# Patient Record
Sex: MALE | Race: WHITE
[De-identification: names, ages, dates, MRNs, and addresses within clinical notes are randomized per-mention and may not be internally consistent; named-entity substitution may affect disease eponyms.]

---

## 2023-04-06 ENCOUNTER — HOSPITAL ENCOUNTER (EMERGENCY)
Dept: HOSPITAL 4 - SED | Age: 58
Discharge: HOME | End: 2023-04-06
Payer: COMMERCIAL

## 2023-04-06 VITALS — SYSTOLIC BLOOD PRESSURE: 113 MMHG

## 2023-04-06 VITALS — BODY MASS INDEX: 21.07 KG/M2 | HEIGHT: 73 IN | WEIGHT: 159 LBS

## 2023-04-06 VITALS — SYSTOLIC BLOOD PRESSURE: 130 MMHG

## 2023-04-06 DIAGNOSIS — Z79.899: ICD-10-CM

## 2023-04-06 DIAGNOSIS — R07.89: ICD-10-CM

## 2023-04-06 DIAGNOSIS — L03.313: Primary | ICD-10-CM

## 2023-04-06 LAB
ALBUMIN SERPL BCP-MCNC: 2.5 G/DL (ref 3.4–4.8)
ALT SERPL W P-5'-P-CCNC: 25 U/L (ref 12–78)
ANION GAP SERPL CALCULATED.3IONS-SCNC: 6 MMOL/L (ref 5–15)
AST SERPL W P-5'-P-CCNC: 15 U/L (ref 10–37)
BASOPHILS # BLD AUTO: 0 K/UL (ref 0–0.2)
BASOPHILS NFR BLD AUTO: 0.5 % (ref 0–2)
BILIRUB SERPL-MCNC: 0.2 MG/DL (ref 0–1)
BUN SERPL-MCNC: 17 MG/DL (ref 8–21)
CALCIUM SERPL-MCNC: 8.8 MG/DL (ref 8.4–11)
CHLORIDE SERPL-SCNC: 103 MMOL/L (ref 98–107)
CREAT SERPL-MCNC: 0.95 MG/DL (ref 0.55–1.3)
CRP SERPL-MCNC: < 0.2 MG/DL (ref 0–0.5)
EOSINOPHIL # BLD AUTO: 0.1 K/UL (ref 0–0.4)
EOSINOPHIL NFR BLD AUTO: 1.8 % (ref 0–4)
ERYTHROCYTE [DISTWIDTH] IN BLOOD BY AUTOMATED COUNT: 13.4 % (ref 9–15)
GFR SERPL CREATININE-BSD FRML MDRD: 104 ML/MIN (ref 90–?)
GLUCOSE SERPL-MCNC: 89 MG/DL (ref 70–99)
HCT VFR BLD AUTO: 28.8 % (ref 36–54)
HGB BLD-MCNC: 9.9 G/DL (ref 14–18)
LYMPHOCYTES # BLD AUTO: 2 K/UL (ref 1–5.5)
LYMPHOCYTES NFR BLD AUTO: 35.4 % (ref 20.5–51.5)
MCH RBC QN AUTO: 35 PG (ref 27–31)
MCHC RBC AUTO-ENTMCNC: 34 % (ref 32–36)
MCV RBC AUTO: 101 FL (ref 79–98)
MONOCYTES # BLD MANUAL: 0.9 K/UL (ref 0–1)
MONOCYTES # BLD MANUAL: 15.1 % (ref 1.7–9.3)
NEUTROPHILS # BLD AUTO: 2.7 K/UL (ref 1.8–7.7)
NEUTROPHILS NFR BLD AUTO: 47.2 % (ref 40–70)
PLATELET # BLD AUTO: 392 K/UL (ref 130–430)
RBC # BLD AUTO: 2.84 MIL/UL (ref 4.2–6.2)
WBC # BLD AUTO: 5.7 K/UL (ref 4.8–10.8)

## 2023-04-06 NOTE — NUR
PT BIB BLS FROM HOME WITH C/O ABCESS DRAINING RIGHT LOWER LATERAL CHEST. 



TWENTY YRS AGO, PT HAD A CHEST IMPLANT PLACED AND BELIEVES IT IS CAUSING THE 
DRAINAGE. 



HX - SCHIZOPHRENIA

PT STATES HE DOES NOT TAKE MEDS FOR HIS MEDICAL CONDITION. 

 

HOME MEDS - IBUPROFEN



PT IS AAXO4, NAD, VSS, PT BREATHING EVEN AND UNLABORED ON RA, PT ON CARDIAC 
MONITOR SHOWING NSR.



SAFETY PRECAUTIONS AND COMFORT MEASURES IN PLACE. 



PENDING MD VIVEROS AND ORDERS.

## 2023-05-22 ENCOUNTER — HOSPITAL ENCOUNTER (EMERGENCY)
Dept: HOSPITAL 4 - SED | Age: 58
Discharge: HOME | End: 2023-05-22
Payer: COMMERCIAL

## 2023-05-22 VITALS — SYSTOLIC BLOOD PRESSURE: 135 MMHG

## 2023-05-22 VITALS — HEIGHT: 73 IN | BODY MASS INDEX: 21.07 KG/M2 | WEIGHT: 159 LBS

## 2023-05-22 VITALS — SYSTOLIC BLOOD PRESSURE: 112 MMHG

## 2023-05-22 DIAGNOSIS — T81.30XA: Primary | ICD-10-CM

## 2023-05-22 DIAGNOSIS — Z79.899: ICD-10-CM

## 2023-05-22 LAB
ALBUMIN SERPL BCP-MCNC: 3.4 G/DL (ref 3.4–4.8)
ALT SERPL W P-5'-P-CCNC: 23 U/L (ref 12–78)
ANION GAP SERPL CALCULATED.3IONS-SCNC: 6 MMOL/L (ref 5–15)
AST SERPL W P-5'-P-CCNC: 20 U/L (ref 10–37)
BASOPHILS # BLD AUTO: 0 K/UL (ref 0–0.2)
BASOPHILS NFR BLD AUTO: 0.7 % (ref 0–2)
BILIRUB SERPL-MCNC: 0.3 MG/DL (ref 0–1)
BUN SERPL-MCNC: 20 MG/DL (ref 8–21)
CALCIUM SERPL-MCNC: 9.7 MG/DL (ref 8.4–11)
CHLORIDE SERPL-SCNC: 100 MMOL/L (ref 98–107)
CREAT SERPL-MCNC: 1 MG/DL (ref 0.55–1.3)
CRP SERPL-MCNC: < 0.2 MG/DL (ref 0–0.5)
EOSINOPHIL # BLD AUTO: 0.1 K/UL (ref 0–0.4)
EOSINOPHIL NFR BLD AUTO: 1.5 % (ref 0–4)
ERYTHROCYTE [DISTWIDTH] IN BLOOD BY AUTOMATED COUNT: 13 % (ref 9–15)
GFR SERPL CREATININE-BSD FRML MDRD: 98 ML/MIN (ref 90–?)
GLUCOSE SERPL-MCNC: 122 MG/DL (ref 70–99)
HCT VFR BLD AUTO: 37.6 % (ref 36–54)
HGB BLD-MCNC: 12.7 G/DL (ref 14–18)
LYMPHOCYTES # BLD AUTO: 1.7 K/UL (ref 1–5.5)
LYMPHOCYTES NFR BLD AUTO: 43.4 % (ref 20.5–51.5)
MCH RBC QN AUTO: 34 PG (ref 27–31)
MCHC RBC AUTO-ENTMCNC: 34 % (ref 32–36)
MCV RBC AUTO: 102 FL (ref 79–98)
MONOCYTES # BLD MANUAL: 0.5 K/UL (ref 0–1)
MONOCYTES # BLD MANUAL: 13 % (ref 1.7–9.3)
NEUTROPHILS # BLD AUTO: 1.6 K/UL (ref 1.8–7.7)
NEUTROPHILS NFR BLD AUTO: 41.4 % (ref 40–70)
PLATELET # BLD AUTO: 336 K/UL (ref 130–430)
RBC # BLD AUTO: 3.7 MIL/UL (ref 4.2–6.2)
WBC # BLD AUTO: 3.9 K/UL (ref 4.8–10.8)